# Patient Record
Sex: FEMALE | Race: WHITE | NOT HISPANIC OR LATINO | Employment: FULL TIME | URBAN - METROPOLITAN AREA
[De-identification: names, ages, dates, MRNs, and addresses within clinical notes are randomized per-mention and may not be internally consistent; named-entity substitution may affect disease eponyms.]

---

## 2023-07-08 ENCOUNTER — OFFICE VISIT (OUTPATIENT)
Dept: URGENT CARE | Facility: CLINIC | Age: 58
End: 2023-07-08
Payer: COMMERCIAL

## 2023-07-08 VITALS
SYSTOLIC BLOOD PRESSURE: 130 MMHG | BODY MASS INDEX: 23.3 KG/M2 | RESPIRATION RATE: 16 BRPM | DIASTOLIC BLOOD PRESSURE: 80 MMHG | HEART RATE: 88 BPM | TEMPERATURE: 98 F | HEIGHT: 66 IN | OXYGEN SATURATION: 99 % | WEIGHT: 145 LBS

## 2023-07-08 DIAGNOSIS — J02.9 SORE THROAT: Primary | ICD-10-CM

## 2023-07-08 LAB — S PYO AG THROAT QL: NEGATIVE

## 2023-07-08 PROCEDURE — 87880 STREP A ASSAY W/OPTIC: CPT | Performed by: PREVENTIVE MEDICINE

## 2023-07-08 PROCEDURE — 99213 OFFICE O/P EST LOW 20 MIN: CPT | Performed by: PREVENTIVE MEDICINE

## 2023-07-08 RX ORDER — LEVOTHYROXINE SODIUM 112 UG/1
TABLET ORAL
COMMUNITY
Start: 2023-05-15

## 2023-07-08 RX ORDER — ROSUVASTATIN CALCIUM 20 MG/1
TABLET, COATED ORAL
COMMUNITY
Start: 2023-06-05

## 2023-07-08 RX ORDER — EZETIMIBE 10 MG/1
TABLET ORAL
COMMUNITY
Start: 2023-06-05

## 2023-07-08 NOTE — PATIENT INSTRUCTIONS
Drink at least 6 or 8 glasses of water or juice a day. Using a vaporizer or humidifier in the bedroom will be very helpful. Motrin or Aleve or aspirin for fever chills or aches. Robitussin DM or NyQuil for cough. Afrin nasal spray for facial and head congestion. Inhaling steam coming up from the sink will be very helpful. If symptoms are getting worse over the next 4-7 days you must be rechecked.

## 2023-07-08 NOTE — PROGRESS NOTES
[facility]        NAME: Erica Pineda is a 66 y.o. female  : 1965    MRN: [id number]  DATE: 2023  TIME: 2:33 PM    Assessment and Plan   Sore throat [J02.9]  1. Sore throat  POCT rapid strepA            Patient Instructions       Follow up with PCP in 3-5 days. Proceed to  ER if symptoms worsen. Chief Complaint     Chief Complaint   Patient presents with   • Cold Like Symptoms     Loss of voice, scratchy throat, ear pain, 5 days,          History of Present Illness       Son came home from camp with a viral illness. Now she has had several days of mucus congestion loss of voice some fatigue and mild sore throat      Review of Systems   Review of Systems   Constitutional: Positive for fatigue and fever. HENT: Positive for congestion, sore throat and voice change. Respiratory: Negative for cough. Current Medications       Current Outpatient Medications:   •  ezetimibe (ZETIA) 10 mg tablet, , Disp: , Rfl:   •  levothyroxine 112 mcg tablet, , Disp: , Rfl:   •  rosuvastatin (CRESTOR) 20 MG tablet, , Disp: , Rfl:   •  sertraline (ZOLOFT) 50 mg tablet, Take 50 mg by mouth daily, Disp: , Rfl:     Current Allergies     Allergies as of 2023 - never reviewed   Allergen Reaction Noted   • Pollen extract Hives 2020   • Erythromycin Rash 2016            The following portions of the patient's history were reviewed and updated as appropriate: allergies, current medications, past family history, past medical history, past social history, past surgical history and problem list.     Past Medical History:   Diagnosis Date   • Hyperlipidemia    • Hypertension        Past Surgical History:   Procedure Laterality Date   • APPENDECTOMY     • FOOT BONE EXCISION         History reviewed. No pertinent family history. Medications have been verified.         Objective   /80   Pulse 88   Temp 98 °F (36.7 °C)   Resp 16   Ht 5' 6" (1.676 m)   Wt 65.8 kg (145 lb) SpO2 99%   BMI 23.40 kg/m²   No LMP recorded. Patient is postmenopausal.       Physical Exam     Physical Exam  HENT:      Right Ear: Tympanic membrane normal.      Left Ear: Tympanic membrane normal.      Mouth/Throat:      Mouth: Mucous membranes are moist.      Pharynx: Oropharynx is clear. Cardiovascular:      Heart sounds: Normal heart sounds. Pulmonary:      Breath sounds: Normal breath sounds. Lymphadenopathy:      Cervical: No cervical adenopathy.      Rapid strep negative